# Patient Record
Sex: FEMALE | Race: WHITE | NOT HISPANIC OR LATINO | Employment: FULL TIME | URBAN - NONMETROPOLITAN AREA
[De-identification: names, ages, dates, MRNs, and addresses within clinical notes are randomized per-mention and may not be internally consistent; named-entity substitution may affect disease eponyms.]

---

## 2024-04-24 ENCOUNTER — OFFICE VISIT (OUTPATIENT)
Dept: URGENT CARE | Facility: CLINIC | Age: 29
End: 2024-04-24
Payer: MEDICAID

## 2024-04-24 VITALS
SYSTOLIC BLOOD PRESSURE: 117 MMHG | HEART RATE: 85 BPM | HEIGHT: 67 IN | WEIGHT: 190 LBS | RESPIRATION RATE: 20 BRPM | BODY MASS INDEX: 29.82 KG/M2 | DIASTOLIC BLOOD PRESSURE: 75 MMHG | TEMPERATURE: 97.5 F | OXYGEN SATURATION: 96 %

## 2024-04-24 DIAGNOSIS — J20.9 ACUTE BRONCHITIS, UNSPECIFIED ORGANISM: Primary | ICD-10-CM

## 2024-04-24 PROCEDURE — 99213 OFFICE O/P EST LOW 20 MIN: CPT | Performed by: NURSE PRACTITIONER

## 2024-04-24 RX ORDER — BENZONATATE 200 MG/1
200 CAPSULE ORAL 3 TIMES DAILY PRN
Qty: 20 CAPSULE | Refills: 0 | Status: SHIPPED | OUTPATIENT
Start: 2024-04-24 | End: 2024-05-01

## 2024-04-24 RX ORDER — AZITHROMYCIN 250 MG/1
TABLET, FILM COATED ORAL
Qty: 6 TABLET | Refills: 0 | Status: SHIPPED | OUTPATIENT
Start: 2024-04-24 | End: 2024-04-29

## 2024-04-24 RX ORDER — PREDNISONE 10 MG/1
TABLET ORAL DAILY
Qty: 21 TABLET | Refills: 0 | Status: SHIPPED | OUTPATIENT
Start: 2024-04-24 | End: 2024-04-29

## 2024-04-24 RX ORDER — ETONOGESTREL 68 MG/1
1 IMPLANT SUBCUTANEOUS ONCE
COMMUNITY

## 2024-04-24 RX ORDER — ALBUTEROL SULFATE 90 UG/1
2 AEROSOL, METERED RESPIRATORY (INHALATION) EVERY 6 HOURS PRN
Qty: 18 G | Refills: 0 | Status: SHIPPED | OUTPATIENT
Start: 2024-04-24 | End: 2025-04-24

## 2024-04-24 NOTE — PROGRESS NOTES
28 y.o. female presents for evaluation of URI.  Symptoms including cough, congestion, body aches, malaise, and headache have been present for 3 days and refractory to OTC meds.  No fever, chills, nausea, vomiting, abdominal pain, CP, or SOB.  No exacerbating factors. No known COVID 19/flu exposure. Declines testing today.    Vitals:    04/24/24 1555   BP: 117/75   Pulse: 85   Resp: 20   Temp: 36.4 °C (97.5 °F)   SpO2: 96%       No Known Allergies    Medication Documentation Review Audit       Reviewed by RENAN Pereira (Nurse Practitioner) on 04/24/24 at 1601      Medication Order Taking? Sig Documenting Provider Last Dose Status   etonogestrel-eluting contraceptive (Nexplanon) 68 mg implant implant [id number]  1 each by subdermal route 1 time. Historical Provider, MD  Active                    No past medical history on file.    No past surgical history on file.    ROS  See HPI    Physical Exam  Vitals and nursing note reviewed.   Constitutional:       General: She is not in acute distress.     Appearance: Normal appearance. She is normal weight. She is not ill-appearing or toxic-appearing.   HENT:      Head: Normocephalic and atraumatic.      Right Ear: Tympanic membrane and ear canal normal.      Left Ear: Tympanic membrane and ear canal normal.      Nose: Congestion present.      Mouth/Throat:      Mouth: Mucous membranes are moist.      Pharynx: Oropharynx is clear.   Eyes:      Extraocular Movements: Extraocular movements intact.      Conjunctiva/sclera: Conjunctivae normal.      Pupils: Pupils are equal, round, and reactive to light.   Cardiovascular:      Rate and Rhythm: Normal rate and regular rhythm.   Pulmonary:      Effort: Pulmonary effort is normal.      Breath sounds: Normal breath sounds.      Comments: Moist cough during exam  Lymphadenopathy:      Cervical: Cervical adenopathy present.   Skin:     General: Skin is warm and dry.      Capillary Refill: Capillary refill takes less than 2  seconds.   Neurological:      General: No focal deficit present.      Mental Status: She is alert and oriented to person, place, and time.   Psychiatric:         Mood and Affect: Mood normal.         Behavior: Behavior normal.           Assessment/Plan/MDM  Rachael was seen today for sore throat.  Diagnoses and all orders for this visit:  Acute bronchitis, unspecified organism (Primary)  -     azithromycin (Zithromax Z-Thompson) 250 mg tablet; Take 2 tablets (500 mg) on  Day 1,  followed by 1 tablet (250 mg) once daily on Days 2 through 5.  -     predniSONE (Deltasone) 10 mg tablet; Take 6 tablets (60 mg) by mouth once daily for 1 day, THEN 5 tablets (50 mg) once daily for 1 day, THEN 4 tablets (40 mg) once daily for 1 day, THEN 3 tablets (30 mg) once daily for 1 day, THEN 2 tablets (20 mg) once daily for 1 day, THEN 1 tablet (10 mg) once daily for 1 day.  -     albuterol 90 mcg/actuation inhaler; Inhale 2 puffs every 6 hours if needed for wheezing.  -     benzonatate (Tessalon) 200 mg capsule; Take 1 capsule (200 mg) by mouth 3 times a day as needed for cough for up to 7 days. Do not crush or chew.    Encouraged pt to continue otc cold remedies PRN, push by mouth fluids and rest. Patient's clinical presentation is otherwise unremarkable at this time. Patient is discharged with instructions to follow-up with primary care or seek emergency medical attention for worsening symptoms or any new concerns.      I did personally review Rachael's past medical history, surgical history, social history, as well as family history (when relevant).  In this case, I also oversaw the her drug management by reviewing her medication list, allergy list, as well as the medications that I prescribed during the UC course and/or recommended as an out-patient (including possible OTC medications such as acetaminophen, NSAIDs , etc).    After reviewing the items above, I did not look at previous medical documentation, such as recent  hospitalizations, office visits, and/or recent consultations with PCP/specialist.                          SDOH:   Another factor that I considered in Rachael's care was her Social Determinants of Health (SDOH). During this UC encounter, she did not have social determinants of health. Those SDOH influencing Rachael's care are: none      Satya Ballard CNP  [facility]  [phone]

## 2024-04-24 NOTE — LETTER
April 24, 2024     Patient: Rachael Gauthier   YOB: 1995   Date of Visit: 4/24/2024       To Whom It May Concern:    Rachael Gauthier was seen in my clinic on 4/24/2024 at 3:50 pm. Please excuse Rachael for her absence from work on this day to make the appointment.    If you have any questions or concerns, please don't hesitate to call.         Sincerely,         Satya Ballard, APRN-CNP        CC: No Recipients

## 2024-07-24 ENCOUNTER — OFFICE VISIT (OUTPATIENT)
Dept: URGENT CARE | Facility: CLINIC | Age: 29
End: 2024-07-24
Payer: MEDICAID

## 2024-07-24 VITALS
SYSTOLIC BLOOD PRESSURE: 99 MMHG | DIASTOLIC BLOOD PRESSURE: 64 MMHG | HEART RATE: 69 BPM | BODY MASS INDEX: 29.66 KG/M2 | RESPIRATION RATE: 16 BRPM | OXYGEN SATURATION: 97 % | WEIGHT: 189 LBS | TEMPERATURE: 97.8 F | HEIGHT: 67 IN

## 2024-07-24 DIAGNOSIS — L24.7 IRRITANT CONTACT DERMATITIS DUE TO PLANTS, EXCEPT FOOD: Primary | ICD-10-CM

## 2024-07-24 PROCEDURE — 99213 OFFICE O/P EST LOW 20 MIN: CPT | Performed by: NURSE PRACTITIONER

## 2024-07-24 PROCEDURE — 96372 THER/PROPH/DIAG INJ SC/IM: CPT | Performed by: NURSE PRACTITIONER

## 2024-07-24 PROCEDURE — 2500000004 HC RX 250 GENERAL PHARMACY W/ HCPCS (ALT 636 FOR OP/ED): Performed by: NURSE PRACTITIONER

## 2024-07-24 RX ORDER — PREDNISONE 10 MG/1
TABLET ORAL
Qty: 30 TABLET | Refills: 0 | Status: SHIPPED | OUTPATIENT
Start: 2024-07-24

## 2024-07-24 RX ORDER — TRIAMCINOLONE ACETONIDE 5 MG/G
CREAM TOPICAL 3 TIMES DAILY
Qty: 30 G | Refills: 0 | Status: SHIPPED | OUTPATIENT
Start: 2024-07-24

## 2024-07-24 NOTE — PROGRESS NOTES
29 y.o. female presents for evaluation of rash scattered over entire body including left side of face that began 4 days ago.  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  No OTC meds for management.  No fever, drainage from rash, significant erythema or any other associated symptom or complaint.      Vitals:    07/24/24 1247   BP: 99/64   Pulse: 69   Resp: 16   Temp: 36.6 °C (97.8 °F)   SpO2: 97%       No Known Allergies    Medication Documentation Review Audit       Reviewed by Genny Grant MA (Medical Assistant) on 07/24/24 at 1245      Medication Order Taking? Sig Documenting Provider Last Dose Status   albuterol 90 mcg/actuation inhaler [id number] No Inhale 2 puffs every 6 hours if needed for wheezing.   Patient not taking: Reported on 7/24/2024    Satya Ballard, APRN-CNP Not Taking Active   etonogestrel-eluting contraceptive (Nexplanon) 68 mg implant implant [id number] Yes 1 each by subdermal route 1 time. Historical Provider, MD Taking Active                    History reviewed. No pertinent past medical history.    History reviewed. No pertinent surgical history.    ROS  See HPI    Physical Exam  Vitals reviewed.   Constitutional:       Appearance: Normal appearance.   Cardiovascular:      Rate and Rhythm: Normal rate.   Musculoskeletal:         General: Normal range of motion.   Skin:     General: Skin is warm and dry.      Findings: Rash (fine vesicular mildly erythematous rash scattered over bilateral upper and lower extremities, abdomen and left side of face without involvement of the eye) present.   Neurological:      General: No focal deficit present.      Mental Status: She is alert and oriented to person, place, and time.   Psychiatric:         Mood and Affect: Mood normal.         Behavior: Behavior normal.           Assessment/Plan/MDM  Rachael was seen today for rash.  Diagnoses and all orders for this visit:  Irritant contact dermatitis due to  plants, except food (Primary)  -     methylPREDNISolone sod succinate (SOLU-Medrol) injection 125 mg  -     predniSONE (Deltasone) 10 mg tablet; 4 tabs po daily x 3 days, then 3 tabs po daily x 3 days, then 2 tab po daily  x3 days, then 1 tab po daily  x 3 days  -     triamcinolone (Kenalog) 0.5 % cream; Apply topically 3 times a day.    Encouraged patient to trial OTC poison ivy remedies as well as prescriptions provided today.  Patient's clinical presentation is otherwise unremarkable at this time. Patient is discharged with instructions to follow-up with primary care or seek emergency medical attention for worsening symptoms or any new concerns.    I did personally review Rachael's past medical history, surgical history, social history, as well as family history (when relevant).  In this case, I also oversaw the her drug management by reviewing her medication list, allergy list, as well as the medications that I prescribed during the UC course and/or recommended as an out-patient (including possible OTC medications such as acetaminophen, NSAIDs , etc).    After reviewing the items above, I did not look at previous medical documentation, such as recent hospitalizations, office visits, and/or recent consultations with PCP/specialist.                          SDOH:   Another factor that I considered in Rachael's care was her Social Determinants of Health (SDOH). During this UC encounter, she did not have social determinants of health. Those SDOH influencing Rachael's care are: none      Satya Ballard CNP  [facility]  [phone]

## 2024-07-24 NOTE — LETTER
July 24, 2024     Patient: Rachael Gauthier   YOB: 1995   Date of Visit: 7/24/2024       To Whom It May Concern:    Rachael Gauthier was seen in my clinic on 7/24/2024 at 12:45 pm. Please excuse Rachael for her absence from work on this day to make the appointment.    If you have any questions or concerns, please don't hesitate to call.         Sincerely,         Satya Ballard, APRN-CNP        CC: No Recipients

## 2025-02-20 ENCOUNTER — HOSPITAL ENCOUNTER (EMERGENCY)
Age: 30
Discharge: LEFT BEFORE DISCHARGE | End: 2025-02-20
Payer: MEDICAID

## 2025-02-20 VITALS
RESPIRATION RATE: 18 BRPM | TEMPERATURE: 98.24 F | HEART RATE: 134 BPM | SYSTOLIC BLOOD PRESSURE: 124 MMHG | OXYGEN SATURATION: 99 % | DIASTOLIC BLOOD PRESSURE: 99 MMHG

## 2025-02-20 VITALS
OXYGEN SATURATION: 99 % | DIASTOLIC BLOOD PRESSURE: 99 MMHG | SYSTOLIC BLOOD PRESSURE: 124 MMHG | TEMPERATURE: 98.24 F | HEART RATE: 126 BPM | BODY MASS INDEX: 30.4 KG/M2 | RESPIRATION RATE: 18 BRPM

## 2025-02-20 DIAGNOSIS — N83.202: ICD-10-CM

## 2025-02-20 DIAGNOSIS — N12: Primary | ICD-10-CM

## 2025-02-20 LAB
ALANINE AMINOTRANSFER ALT/SGPT: 89 U/L (ref 13–56)
ALBUMIN SERPL-MCNC: 4.2 G/DL (ref 3.2–5)
ALKALINE PHOSPHATASE: 80 U/L (ref 45–117)
ANION GAP: 5 (ref 5–15)
AST(SGOT): 65 U/L (ref 15–37)
BILIRUB UR QL STRIP: 1 MG/DL
BUN SERPL-MCNC: 15 MG/DL (ref 7–18)
BUN/CREAT RATIO: 15.2 RATIO (ref 10–20)
CALCIUM SERPL-MCNC: 9.6 MG/DL (ref 8.5–10.1)
CARBON DIOXIDE: 29 MMOL/L (ref 21–32)
CHLORIDE: 106 MMOL/L (ref 98–107)
CREAT SERPL-MCNC: 0.99 MG/DL (ref 0.55–1.02)
DEPRECATED RDW RBC: 41.1 FL (ref 35.1–43.9)
ERYTHROCYTE [DISTWIDTH] IN BLOOD: 12.2 % (ref 11.6–14.6)
EST GLOM FILT RATE - AFR AMER: 86 ML/MIN (ref 60–?)
ESTIMATED CREATININE CLEARANCE: 96.43 ML/MIN
GLOBULIN: 4.2 G/DL (ref 2.2–4.2)
GLUCOSE: 87 MG/DL (ref 74–106)
HCT VFR BLD AUTO: 42.8 % (ref 37–47)
HEMOGLOBIN: 14.2 G/DL (ref 12–15)
HGB BLD-MCNC: 14.2 G/DL (ref 12–15)
IMMATURE GRANULOCYTES COUNT: 0.03 X10^3/UL (ref 0–0)
INTERNAL QC VALIDATED?: (no result)
KETONE-DIPSTICK: 15 MG/DL
LEUKOCYTE ESTERASE UR QL STRIP: 500 /UL
MCV RBC: 91.8 FL (ref 81–99)
MEAN CORP HGB CONC: 33.2 G/DL (ref 32–36)
MEAN PLATELET VOL.: 10.1 FL (ref 6.2–12)
MUCOUS THREADS URNS QL MICRO: (no result) /HPF
NRBC FLAGGED BY ANALYZER: 0 % (ref 0–5)
PLATELET # BLD: 276 K/MM3 (ref 150–450)
PLATELET COUNT: 276 K/MM3 (ref 150–450)
POTASSIUM: 3.3 MMOL/L (ref 3.5–5.1)
PROT UR QL STRIP.AUTO: 30 MG/DL
RBC # BLD AUTO: 4.66 M/MM3 (ref 4.2–5.4)
RBC DISTRIBUTION WIDTH CV: 12.2 % (ref 11.6–14.6)
RBC DISTRIBUTION WIDTH SD: 41.1 FL (ref 35.1–43.9)
RBC UR QL: (no result) /HPF (ref 0–5)
RBC UR QL: 10 /UL
SP GR UR: 1.02 (ref 1–1.03)
SQUAMOUS URNS QL MICRO: (no result) /HPF (ref 5–10)
URINE PRESERVATIVE: (no result)
WBC # BLD AUTO: 8.5 K/MM3 (ref 4.4–11)
WHITE BLOOD COUNT: 8.5 K/MM3 (ref 4.4–11)

## 2025-02-20 PROCEDURE — 96374 THER/PROPH/DIAG INJ IV PUSH: CPT

## 2025-02-20 PROCEDURE — 85025 COMPLETE CBC W/AUTO DIFF WBC: CPT

## 2025-02-20 PROCEDURE — 74177 CT ABD & PELVIS W/CONTRAST: CPT

## 2025-02-20 PROCEDURE — 87631 RESP VIRUS 3-5 TARGETS: CPT

## 2025-02-20 PROCEDURE — 87088 URINE BACTERIA CULTURE: CPT

## 2025-02-20 PROCEDURE — 96375 TX/PRO/DX INJ NEW DRUG ADDON: CPT

## 2025-02-20 PROCEDURE — 83605 ASSAY OF LACTIC ACID: CPT

## 2025-02-20 PROCEDURE — 80053 COMPREHEN METABOLIC PANEL: CPT

## 2025-02-20 PROCEDURE — 87086 URINE CULTURE/COLONY COUNT: CPT

## 2025-02-20 PROCEDURE — 99283 EMERGENCY DEPT VISIT LOW MDM: CPT

## 2025-02-20 PROCEDURE — 81025 URINE PREGNANCY TEST: CPT

## 2025-02-20 PROCEDURE — A4216 STERILE WATER/SALINE, 10 ML: HCPCS

## 2025-02-20 PROCEDURE — 96361 HYDRATE IV INFUSION ADD-ON: CPT

## 2025-02-20 PROCEDURE — 81001 URINALYSIS AUTO W/SCOPE: CPT

## 2025-02-20 NOTE — ED.RN
PT REQUESTED HER IV TO BE REMOVED AND IS LEAVING.  STATES SHE W3AS MOVED FROM HER ROOM TO A HALLWAY BED AND ALSO HAD VISITORS COME BACK WHEN SHE DIDN'T WANT ANY.  STATES SOMETHING ABOUT A SISTER IN LAW BUT DIDN'T REPEAT WHAT WAS SAID SO UNSURE WHAT 
IS GOING ON.  IV WAS REMOVED AND PT AMBULATED FROM ER.

## 2025-02-20 NOTE — EDS_ITS
HPI    
HPI - GI    
History of Present Illness    
Chief Complaint: Flank Pain    
Informant: patient    
Narrative    
Narrative:     
Patient is a 29-year-old female with history of pyelonephritis and GLEN   
presenting with worsening nausea, vomiting and left flank pain.  Patient states   
she came down with flulike symptoms with nausea and vomiting 5 days ago.  She is  
having worsening pain in the back that started yesterday.  She describes the   
pain as constant with fluctuating intensity.  States it radiates to her back.   
States the pain is now unbearable.   She does want her left at the bottom of her  
ribs.  She does note her urines been dark for the past 3 days.  She denies any   
dysuria.  She denies any abnormal vaginal discharge.  She has no history of   
kidney stones.  Has Not noticed any hematuria but notes her urine has been   
darker.  Yesterday she had temperature by 9.9.  Yesterday she went to an outside  
emergency room where they did a urinalysis, gave her a dose of Zofran IM Toradol  
and discharged her home with a prescription for Omnicef, Zofran and oral   
Toradol.  She feels her symptoms are worsening came in for repeat evaluation.    
She tells me that about 2 years ago she had kidney infection with E. coli is   
admitted to the hospital for couple days.  She had an GLEN at that time.  She   
follows with outpatient labs for 7 months until her kidney function normalized.   
Denies any history of any abdominal surgeries.  Denies any concern for   
pregnancy.  No other complaints or concerns at this time.    
    
PFSH    
PFSH    
                                            
    
    
    
Allergy/AdvReac Type Severity Reaction Status Date / Time    
     
No Known Allergies Allergy   Verified 02/20/25 10:45    
    
    
    
Social History (Reviewed 02/20/25 @ 20:40 by Dr. Kacey Sanches, DO)    
Smoking Status:  Never smoker     
    
    
    
ROS    
ROS ED    
Constitutional    
Constitutional ED: Reports chills; Denies fever(s)    
Cardiovascular    
Cardiovascular: Denies chest pain    
Respiratory/Chest    
Respiratory/Chest: Denies cough or dyspnea    
Gastrointestinal    
Gastrointestinal: Reports abdominal pain, nausea and vomiting; Denies   
constipation or diarrhea    
Genitourinary    
Genitourinary ED: Denies dysuria, hematuria or urinary frequency    
Musculoskeletal    
Musculoskeletal: Reports back pain    
Integumentary    
Denies rash    
Neurologic    
Neurologic: Denies weakness    
    
EXAM    
Physical Exam    
Const    
Vital Signs:     
    
                                            
    
    
    
 02/20/25    
10:44 02/20/25    
10:44 02/20/25    
11:22    
     
Temperature  98.3 F 98.3 F    
     
Temperature Source  Temporal Temporal    
     
Pulse Rate 126 H 134 H 134 H    
     
Respiratory Rate  18 18    
     
Blood Pressure  124/99 H 124/99 H    
     
Blood Pressure Mean  107 107    
     
Pulse Ox  99 99    
     
Oxygen Delivery Method  Room Air Room Air    
    
    
    
    
Positive well nourished and well developed    
General Appearance ED: well developed and NAD    
HEENT    
Reports moist mucous membranes    
Eyes    
General Eye ED: Negative for scleral icterus    
Neck    
supple    
Resp    
normal respiratory effort and clear to auscultation bilaterally    
Cardio    
regular rate, regular rhythm and no murmurs    
GI    
non-tender and non-distended    
Auscultation: normoactive bowel sounds    
Palpation: soft; Negative for tender or guarding    
Back/Spine    
General Back: CVA tenderness left    
Thoracic Spine / Upper Back: Negative for thoracic spinal tenderness    
Lumbar Spine / Lower Back: Negative for lumbar spinal tenderness    
Extremity    
General Extremety ED: Negative for edema    
General Extremity: Negative for edema    
Neuro    
Sensorium / Orientation: alert    
Motor Exam: Negative for general weakness    
Psych    
mental status grossly normal and thought process normal    
Skin    
Rashes: no rashes    
    
MDM    
MDM    
MDM Narrative    
Medical decision making narrative:     
Patient is evaluated for worsening left flank pain.  Had outside urinalysis)   
antibiotics yesterday.    
Differential includes pyelonephritis, renal colic, colitis, muscle skeletal   
pain, shingles, ectopic pregnancy and gastritis.  She does not have any   
overlying rash or lower suspicion for shingles.    
Patient initially given IV fluids she reports multiple sets of vomiting is ta  
chycardic on arrival, Zofran, Toradol and morphine for symptom control.    
    
CBC normal.  Lactate added on because of patient's tachycardia but this is   
normal.  CMP shows mild elevation of AST and LAT which is nonspecific.  This is   
on the wrong side of the patient's pain so lower suspicion for acute   
hepatobiliary process.  Urinalysis is consistent with infection with 10-25 white  
blood cells, 1+ bacteria and 500 leukocyte esterase.  Her pregnancy test is   
negative.  Lower suspicion for ectopic based on negative pregnancy test.    
CT of the abdomen pelvis with IV contrast obtained shows a 2.3 cm cyst on the   
left ovary but otherwise no acute process.  Her pain is pretty localized to the   
CVA regions of lower suspicion that ovarian torsion is causing her symptoms.    
Patient eloped to the emergency room prior to going over results.  Per nursing   
report patient became quite upset when her spouse and 2 young children were   
brought back to see her stating that she did not consent for visitors.  Nursing   
staff informing that she never informed them that she did not want visitors.    
    
Given that review of her ER visit from yesterday through [facility] shows that   
she was prescribed cefdinir she should continue to take this.  Urine culture was  
sent today.    
Because of her pain she will be called pyelonephritis however she is not have   
any other laboratory finding concerning for systemic infection.    
Unable to evaluate patient to see how she felt after interventions or to repeat   
heart rate but she had eloped from the emergency room.    
    
Lab Data    
Labs:     
    
                         Laboratory Results - last 24 hr    
    
    
    
  02/20/25 02/20/25    
    
  11:30 11:59    
     
WBC  8.5     
     
RBC  4.66     
     
Hgb  14.2     
     
Hct  42.8     
     
MCV  91.8     
     
MCH  30.5     
     
MCHC  33.2     
     
RDW Std Deviation  41.1     
     
RDW Coeff of Evaristo  12.2     
     
Plt Count  276     
     
MPV  10.1     
     
Immature Gran % (Auto)  0.400     
     
Neut % (Auto)  54.7     
     
Lymph % (Auto)  36.1     
     
Mono % (Auto)  6.5     
     
Eos % (Auto)  1.7     
     
Baso % (Auto)  0.6     
     
Absolute Neuts (auto)  4.6     
     
Absolute Lymphs (auto)  3.05     
     
Nucleated RBC %  0     
     
Sodium  140     
     
Potassium  3.3 L     
     
Chloride  106     
     
Carbon Dioxide  29.0     
     
Anion Gap  5     
     
BUN  15     
     
Creatinine  0.99     
     
Estim Creat Clear Calc  96.43     
     
Est GFR (MDRD) Af Amer  86     
     
Est GFR (MDRD) Non-Af  71     
     
BUN/Creatinine Ratio  15.2     
     
Glucose  87     
     
Lactic Acid  1.3     
     
Calcium  9.6     
     
Total Bilirubin  1.20 H     
     
AST  65 H     
     
ALT  89 H     
     
Alkaline Phosphatase  80     
     
Total Protein  8.4 H     
     
Albumin  4.2     
     
Globulin  4.2     
     
Albumin/Globulin Ratio  1.0     
     
Urine Color   Yellow    
     
Urine Clarity   Sl. Cloudy    
     
Urine pH   6.0    
     
Ur Specific Gravity   1.020    
     
Urine Protein   30 H    
     
Urine Glucose (UA)   Normal    
     
Urine Ketones   15 H    
     
Urine Occult Blood   10 H    
     
Urine Nitrite   Negative    
     
Urine Bilirubin   1 H    
     
Urine Urobilinogen   12 H    
     
Ur Leukocyte Esterase   500 H    
     
Urine RBC   0 SEEN    
     
Urine WBC   10-25 SEEN    
     
Ur Squamous Epith Cells   5-10 SEEN    
     
Urine Bacteria   1+    
     
Urine Mucus   1+    
     
Urine Pregnancy Test   Negative    
    
    
    
    
Radiography    
Diagnostic Testing:     
    
Clinical Impression(s) from Imaging Studies    
    
Abdomen/Pelvis CT  02/20/25 12:30    
IMPRESSION:    
2.3 cm cyst in the left ovary.    
     
One or more dose reduction techniques were used (e.g., Automated exposure   
control, adjustment of the mA and/or kV according to    
patient size, use of iterative reconstruction technique).    
     
Electronically Signed By: Farhad Madera on 02/20/2025 13:08    
[facility]    
    
    
    
    
    
Discharge Plan    
Triage    
Chief Complaint: Flank Pain    
    
ED Provider: Kacey Sanches    
    
Dx/Rx/DC Orders    
Clinical Impression:    
 Acute left flank pain, Pyelonephritis    
    
    
Primary Care Provider: Care Physician,No Primary    
    
Referrals:    
Care Physician,No Primary [Primary Care Provider] -     
    
Print Language: English    
    
Disposition    
***Disposition***: Elopement    
    
Discharge Date/Time: 02/20/25 13:54

## 2025-02-20 NOTE — CT_ITS
PROCEDURE:  
ABDOMEN/PELVIS W IV CONT ONLY  
   
REASON FOR EXAM:  
Left flank pain.  Recent diagnosis of renal infection.  
   
TECHNIQUE:  
Abdomen and pelvis CT with intravenous contrast. No oral contrast.  
IV CONTRAST: 100 cc of Isovue-300.  
   
COMPARISON:  
None.  
   
FINDINGS:  
Lung bases: Clear  
   
Liver: Unremarkable.  
   
Gallbladder: Unremarkable.  
   
Spleen: Unremarkable.  
   
Pancreas: Unremarkable.  
   
Adrenals: Unremarkable.  
   
Kidneys: Unremarkable.  
   
Bladder: Unremarkable.  
   
Reproductive Organs: 2.3 cm cyst in the left ovary.  Follicles are seen in the 
right ovary.  
   
Bowel: Colonic diverticulosis without diverticulitis.  
   
Appendix: Normal.  
   
Lymph nodes: No suspicious lymph node enlargement.  
   
Vasculature: Major vascular structures are unremarkable.  
   
Peritoneum / Retroperitoneum: No ascites. No free air.  
   
Bones: Unremarkable.  
   
ORDER #: [id number] CT/Abdomen/Pelvis W IV Cont ONLY  
IMPRESSION:  
2.3 cm cyst in the left ovary.  
   
One or more dose reduction techniques were used (e.g., Automated exposure contr
ol, adjustment of the mA and/or kV according to  
patient size, use of iterative reconstruction technique).  
   
Electronically Signed By: Farhad Madera on 02/20/2025 13:08  
Reading Location: [facility]

## 2025-06-02 ENCOUNTER — APPOINTMENT (OUTPATIENT)
Facility: CLINIC | Age: 30
End: 2025-06-02
Payer: MEDICAID

## 2025-06-02 VITALS — SYSTOLIC BLOOD PRESSURE: 104 MMHG | BODY MASS INDEX: 28.74 KG/M2 | HEIGHT: 68 IN | DIASTOLIC BLOOD PRESSURE: 62 MMHG

## 2025-06-02 DIAGNOSIS — Z30.09 ENCOUNTER FOR GENERAL COUNSELING AND ADVICE ON CONTRACEPTIVE MANAGEMENT: Primary | ICD-10-CM

## 2025-06-02 PROCEDURE — 99203 OFFICE O/P NEW LOW 30 MIN: CPT | Performed by: OBSTETRICS & GYNECOLOGY

## 2025-06-02 PROCEDURE — 1036F TOBACCO NON-USER: CPT | Performed by: OBSTETRICS & GYNECOLOGY

## 2025-06-02 ASSESSMENT — COLUMBIA-SUICIDE SEVERITY RATING SCALE - C-SSRS
6. HAVE YOU EVER DONE ANYTHING, STARTED TO DO ANYTHING, OR PREPARED TO DO ANYTHING TO END YOUR LIFE?: NO
1. IN THE PAST MONTH, HAVE YOU WISHED YOU WERE DEAD OR WISHED YOU COULD GO TO SLEEP AND NOT WAKE UP?: NO
2. HAVE YOU ACTUALLY HAD ANY THOUGHTS OF KILLING YOURSELF?: NO

## 2025-06-02 ASSESSMENT — ANXIETY QUESTIONNAIRES
3. WORRYING TOO MUCH ABOUT DIFFERENT THINGS: NOT AT ALL
4. TROUBLE RELAXING: NOT AT ALL
7. FEELING AFRAID AS IF SOMETHING AWFUL MIGHT HAPPEN: NOT AT ALL
2. NOT BEING ABLE TO STOP OR CONTROL WORRYING: NOT AT ALL
IF YOU CHECKED OFF ANY PROBLEMS ON THIS QUESTIONNAIRE, HOW DIFFICULT HAVE THESE PROBLEMS MADE IT FOR YOU TO DO YOUR WORK, TAKE CARE OF THINGS AT HOME, OR GET ALONG WITH OTHER PEOPLE: NOT DIFFICULT AT ALL
1. FEELING NERVOUS, ANXIOUS, OR ON EDGE: NOT AT ALL
6. BECOMING EASILY ANNOYED OR IRRITABLE: NOT AT ALL
5. BEING SO RESTLESS THAT IT IS HARD TO SIT STILL: NOT AT ALL
GAD7 TOTAL SCORE: 0

## 2025-06-02 ASSESSMENT — PATIENT HEALTH QUESTIONNAIRE - PHQ9
2. FEELING DOWN, DEPRESSED OR HOPELESS: NOT AT ALL
1. LITTLE INTEREST OR PLEASURE IN DOING THINGS: NOT AT ALL
SUM OF ALL RESPONSES TO PHQ9 QUESTIONS 1 AND 2: 0

## 2025-06-02 ASSESSMENT — PAIN SCALES - GENERAL: PAINLEVEL_OUTOF10: 0-NO PAIN

## 2025-06-02 NOTE — PROGRESS NOTES
Rachael Gauthier is a 29 y.o. year old female patient.  PCP = No primary care provider on file.    Chief Complaint   Patient presents with    Procedure     Pt here to have nexplanon removed and discuss other BC options. Pt states she had nexplanon inserted in [place] in May of 2022. Pt c/o break through bleeding and cramping       HPI   Presents stating that the Nexplanon was placed in May 2022.  She has noticed some irregular bleeding over the last 2 months but otherwise has been doing well with Nexplanon.  Denies any bowel or bladder problems.    OB History          2    Para   2    Term   2            AB        Living   2         SAB        IAB        Ectopic        Multiple        Live Births   2                 Medical History[1]    Surgical History[2]    Review of Systems:   Constitutional: No fever or chills  Respiratory: No shortness of breath, or cough  Cardiovascular: No chest pain or syncope  Breasts: No breast pain, no masses, no nipple discharge  Gastrointestinal: No nausea, vomiting, or diarrhea, no abdominal pain  Genitourinary: No dysuria or frequency  Gynecology: Negative except as noted in history of present illness  All other: All other systems reviewed and negative for complaint    Medication Documentation Review Audit       Reviewed by Jamal Luke MD (Physician) on 25 at 1447      Medication Order Taking? Sig Documenting Provider Last Dose Status   albuterol 90 mcg/actuation inhaler [id number]  Inhale 2 puffs every 6 hours if needed for wheezing.   Patient not taking: Reported on 2024    Satya Ballard, APRN-CNP   [id number] [phone]   etonogestrel-eluting contraceptive (Nexplanon) 68 mg implant implant [id number]  1 each by subdermal route 1 time. Historical Provider, MD  Active   predniSONE (Deltasone) 10 mg tablet [id number]  4 tabs po daily x 3 days, then 3 tabs po daily x 3 days, then 2 tab po daily  x3 days, then 1 tab po daily  x 3 days   Patient not  "taking: Reported on 6/2/2025    RENAN Pereira  Active   triamcinolone (Kenalog) 0.5 % cream [id number]  Apply topically 3 times a day.   Patient not taking: Reported on 6/2/2025    RENAN Pereira  Active                     /62   Ht 1.727 m (5' 8\")   LMP  (LMP Unknown)   BMI 28.74 kg/m²     PHYSICAL EXAMINATION:  General: No acute distress  Eye: Intraocular movements are intact  HEENT: Normocephalic  Respiratory: Respirations are nonlabored  Gastrointestinal: Nondistended   Musculoskeletal: Normal range of motion  Neurologic: Alert and oriented x3  Psychiatric: Cooperative, appropriate mood and affect.      Problem List Items Addressed This Visit    None  Visit Diagnoses         Encounter for general counseling and advice on contraceptive management    -  Primary             Provider Impression:  1.  Contraceptive counseling  Discussed various forms of contraception and she has decided to have the Nexplanon replaced.  Patient to return in the near future for replacing the Nexplanon.       [1] History reviewed. No pertinent past medical history.  [2] History reviewed. No pertinent surgical history.    "

## 2025-07-07 ENCOUNTER — HOSPITAL ENCOUNTER (OUTPATIENT)
Dept: RADIOLOGY | Facility: HOSPITAL | Age: 30
Discharge: HOME | End: 2025-07-07
Payer: MEDICAID

## 2025-07-07 ENCOUNTER — PROCEDURE VISIT (OUTPATIENT)
Facility: CLINIC | Age: 30
End: 2025-07-07
Payer: MEDICAID

## 2025-07-07 ENCOUNTER — OFFICE VISIT (OUTPATIENT)
Dept: URGENT CARE | Facility: CLINIC | Age: 30
End: 2025-07-07
Payer: MEDICAID

## 2025-07-07 VITALS
HEART RATE: 74 BPM | DIASTOLIC BLOOD PRESSURE: 81 MMHG | SYSTOLIC BLOOD PRESSURE: 120 MMHG | WEIGHT: 195 LBS | OXYGEN SATURATION: 99 % | BODY MASS INDEX: 30.61 KG/M2 | HEIGHT: 67 IN | TEMPERATURE: 98.7 F

## 2025-07-07 DIAGNOSIS — M17.10 PRIMARY OSTEOARTHRITIS OF KNEE, UNSPECIFIED LATERALITY: ICD-10-CM
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
## 2025-07-07 PROCEDURE — 73564 X-RAY EXAM KNEE 4 OR MORE: CPT | Mod: LT

## 2025-07-07 PROCEDURE — 73564 X-RAY EXAM KNEE 4 OR MORE: CPT | Mod: LEFT SIDE | Performed by: RADIOLOGY

## 2025-07-07 PROCEDURE — 99213 OFFICE O/P EST LOW 20 MIN: CPT | Performed by: NURSE PRACTITIONER

## 2025-07-07 RX ORDER — METHYLPREDNISOLONE 4 MG/1
TABLET ORAL
Qty: 21 TABLET | Refills: 0 | Status: SHIPPED | OUTPATIENT
Start: 2025-07-07

## 2025-07-07 NOTE — LETTER
July 7, 2025     Patient: Rachael Gauthier   YOB: 1995   Date of Visit: 7/7/2025       To Whom It May Concern:    It is my medical opinion that Rachael Gauthier may return to light duty immediately with the following restrictions: knee brace as needed for comfort/limit weight bearing per comfort.    If you have any questions or concerns, please don't hesitate to call.         Sincerely,        OSIEL Garcia-CNP    CC: No Recipients

## 2025-07-07 NOTE — PROGRESS NOTES
[facility] URGENT CARE NARAYAN NOTE:      Name: Rachael Gauthier, 29 y.o.    CSN:[id number]   MRN:[id number]    PCP: Gume Saunders PA-C    ALL:  Allergies[1]    History:    Chief Complaint: Knee Injury (Left knee pain after falling down some stairs X 3 days ago )    Encounter Date: 7/7/2025     HPI: The history was obtained from the patient. Rachael is a 29 y.o. female, who presents with a chief complaint of Knee Injury (Left knee pain after falling down some stairs X 3 days ago ).  States she was walking down her steps on Friday morning (approximately 10 steps) and was three fourths of the way down when she felt a staple underneath the carpet and fell down the remaining stairs onto her left knee. Did not hit head. Pain is about a 7 out of 10 with activity, but is weightbearing, ambulating with a limp.  Denies any laxity of the joint but does feel like her knee could give out.  There is some local swelling around the patella, without any ballotable effusion noted, and some bruising on the kneecap.  Physical exam was negative for injury to patellar ligaments, no laxity noted on varus or valgus stress.  Denies any numbness or tingling or loss of function to the left leg and knee.  Can achieve approximately 120-130 degrees of knee flexion similar to right knee however there is significant pain noted with full flexion.  Has been wearing knee brace/compression dressing at home as needed, been taking 2-3 ibuprofen every 4-6 hours for pain, had initially iced it, but best results have come from over-the-counter IcyHot pads.  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  She does need a participation clearance for clinical, will provide.  Advised her to limit weightbearing activities and moderate to intense physical activity, and perhaps choose a clinical assignment that is not demanding.  Denies any other constitutional complaints at this  time.    PMHx:    Medical History[2]         Current Medications[3]      PMSx:  Surgical History[4]    Fam Hx: Family History[5]    SOC. Hx:     Social History     Socioeconomic History    Marital status: Single     Spouse name: Not on file    Number of children: 2    Years of education: Not on file    Highest education level: Not on file   Occupational History    Not on file   Tobacco Use    Smoking status: Former     Types: Cigarettes    Smokeless tobacco: Never   Vaping Use    Vaping status: Every Day    Devices: Disposable   Substance and Sexual Activity    Alcohol use: Yes     Comment: rarely    Drug use: Yes     Types: Marijuana    Sexual activity: Yes   Other Topics Concern    Not on file   Social History Narrative    Not on file     Social Drivers of Health     Financial Resource Strain: Low Risk  (2/26/2025)    Received from [facility]    Overall Financial Resource Strain (CARDIA)     Difficulty of Paying Living Expenses: Not hard at all   Food Insecurity: No Food Insecurity (2/26/2025)    Received from [facility]    Hunger Vital Sign     Worried About Running Out of Food in the Last Year: Never true     Ran Out of Food in the Last Year: Never true   Transportation Needs: No Transportation Needs (2/26/2025)    Received from [facility]    PRAPARE - Transportation     Lack of Transportation (Medical): No     Lack of Transportation (Non-Medical): No   Physical Activity: Not on file   Stress: No Stress Concern Present (6/2/2025)    [place] [facility] [employer] - Occupational Stress Questionnaire     Feeling of Stress : Not at all   Social Connections: Unknown (2/26/2025)    Received from [facility]    Social Connection and Isolation Panel [NHANES]     Frequency of Communication with Friends and Family: Not on file     Frequency of Social Gatherings with Friends and Family: Once a week     Attends Anglican Services: Not on file     Active Member of Clubs or Organizations: Not on file     Attends  Club or Organization Meetings: Not on file     Marital Status: Not on file   Intimate Partner Violence: Not on file   Housing Stability: Not on file         Vitals:    07/07/25 1443   BP: 120/81   Pulse: 74   Temp: 37.1 °C (98.7 °F)   SpO2: 99%     88.5 kg (195 lb)          Physical Exam  Vitals reviewed.   Constitutional:       Appearance: Normal appearance.   HENT:      Head: Normocephalic and atraumatic.   Cardiovascular:      Rate and Rhythm: Normal rate and regular rhythm.      Pulses: Normal pulses.      Heart sounds: Normal heart sounds, S1 normal and S2 normal.   Pulmonary:      Effort: Pulmonary effort is normal.      Breath sounds: Normal breath sounds.   Abdominal:      General: Abdomen is flat. Bowel sounds are normal.      Palpations: Abdomen is soft.   Musculoskeletal:         General: Normal range of motion.        Legs:    Skin:     General: Skin is warm and dry.      Capillary Refill: Capillary refill takes less than 2 seconds.   Neurological:      Mental Status: She is alert. Mental status is at baseline.   Psychiatric:         Mood and Affect: Mood normal.         Behavior: Behavior normal.           LABORATORY @ RADIOLOGICAL IMAGING (if done):       Study Result    Narrative & Impression   Interpreted By:  Suleiman Pinto,   STUDY:  XR KNEE LEFT 4+ VIEWS      INDICATION:  Signs/Symptoms:fall.      COMPARISON:  None      ACCESSION NUMBER(S):  [id number]      ORDERING CLINICIAN:  ASIYA CHIU      FINDINGS:  No osseous, articular, or soft tissue abnormality identified.      IMPRESSION:  Normal left knee radiographs.      Signed by: Suleiman Pinto 7/7/2025 3:49 PM  Dictation workstation:   [id number]     ____________________________________________________________________    I did personally review Rachael's past medical history, surgical history, social history, as well as family history (when relevant).  In this case, I also oversaw the her drug management by reviewing her medication list, allergy  list, as well as the medications that I prescribed during the UC course and/or recommended as an out-patient (including possible OTC medications such as acetaminophen, NSAIDs , etc).    After reviewing the items above, I did look at previous medical documentation, such as recent hospitalizations, office visits, and/or recent consultations with PCP/specialist.                          SDOH:   Another factor that I considered in Rachael's care was her Social Determinants of Health (SDOH). During this UC encounter, she did not have social determinants of health. Those SDOH influencing Rachael's care are: none      UC COURSE/MEDICAL DECISION MAKING:    Rachael is a 29 y.o., who presents with a working diagnosis of   1. Fall, initial encounter     with a differential to include: Sprain, strain, contusion, fracture, avulsion fracture, dislocation, tendinitis, tenosynovitis    Plan:   XR left knee 4+ views - negative  Hinged knee splint provided  Cleared to continue with Nursing clinicals at [facility], with stipulation to continue to wear knee brace and limit physical act; pre participation clearance note provided  Return to urgent care, primary care provider, or emergency department with worsening symptoms.      I, Rodolfo Luke, helped prepare the medical record for my supervising clinician, Clover Jaramillo DNP.     Rodolfo Luke RN, [facility]    Supervised by  Clover Jaramillo DNP   Advanced Practice Provider  [facility] URGENT CARE    I was present with the [employer] student who participated in the documentation of this note. I have personally seen and re-examined the patient and performed the medical decision-making components (assessment and plan of care). I have reviewed the APRN student documentation and verified the findings in the note as written with additions or exceptions as stated in the body of this note.           [1] No Known Allergies  [2] No past medical history on file.  [3]   Current  Outpatient Medications   Medication Sig Dispense Refill    etonogestrel-eluting contraceptive (Nexplanon) 68 mg implant implant 1 each by subdermal route 1 time.      albuterol 90 mcg/actuation inhaler Inhale 2 puffs every 6 hours if needed for wheezing. (Patient not taking: Reported on 7/24/2024) 18 g 0    methylPREDNISolone (Medrol Dospak) 4 mg tablets Follow schedule on package instructions 21 tablet 0    predniSONE (Deltasone) 10 mg tablet 4 tabs po daily x 3 days, then 3 tabs po daily x 3 days, then 2 tab po daily  x3 days, then 1 tab po daily  x 3 days (Patient not taking: No sig reported) 30 tablet 0    triamcinolone (Kenalog) 0.5 % cream Apply topically 3 times a day. (Patient not taking: No sig reported) 30 g 0     No current facility-administered medications for this visit.   [4] No past surgical history on file.  [5]   Family History  Problem Relation Name Age of Onset    Hypertension Mother      Other (psychological disorder) Father      Hypertension Maternal Grandmother      Heart attack Maternal Grandfather      Esophageal cancer Maternal Grandfather      Ovarian cancer Paternal Grandmother      Hypertension Paternal Grandfather

## 2025-07-16 ENCOUNTER — APPOINTMENT (OUTPATIENT)
Facility: CLINIC | Age: 30
End: 2025-07-16
Payer: MEDICAID

## 2025-07-28 ENCOUNTER — APPOINTMENT (OUTPATIENT)
Facility: CLINIC | Age: 30
End: 2025-07-28
Payer: MEDICAID

## 2025-07-28 VITALS
BODY MASS INDEX: 35.47 KG/M2 | HEIGHT: 67 IN | WEIGHT: 226 LBS | SYSTOLIC BLOOD PRESSURE: 116 MMHG | DIASTOLIC BLOOD PRESSURE: 58 MMHG

## 2025-07-28 DIAGNOSIS — Z30.46 ENCOUNTER FOR REMOVAL AND REINSERTION OF NEXPLANON: ICD-10-CM

## 2025-07-28 LAB — PREGNANCY TEST URINE, POC: NEGATIVE

## 2025-07-28 PROCEDURE — 11983 REMOVE/INSERT DRUG IMPLANT: CPT | Performed by: OBSTETRICS & GYNECOLOGY

## 2025-07-28 PROCEDURE — 81025 URINE PREGNANCY TEST: CPT | Performed by: OBSTETRICS & GYNECOLOGY

## 2025-07-28 RX ORDER — LIDOCAINE HYDROCHLORIDE 10 MG/ML
5 INJECTION, SOLUTION INFILTRATION; PERINEURAL ONCE
Status: COMPLETED | OUTPATIENT
Start: 2025-07-28 | End: 2025-07-28

## 2025-07-28 RX ADMIN — LIDOCAINE HYDROCHLORIDE 5 ML: 10 INJECTION, SOLUTION INFILTRATION; PERINEURAL at 15:34

## 2025-07-28 ASSESSMENT — PAIN SCALES - GENERAL: PAINLEVEL_OUTOF10: 0-NO PAIN

## 2025-07-28 NOTE — PROGRESS NOTES
"Patient ID: Rachael Gauthier is a 30 y.o. female.  Current Nexplanon is in the left inner arm  Chief Complaint   Patient presents with    Procedure     Patient is here for Nexplanon removal and Replacement.         /58   Ht 1.702 m (5' 7\")   Wt 103 kg (226 lb)   BMI 35.40 kg/m²      Insertion of Contraceptive Capsule    Date/Time: 7/28/2025 3:33 PM    Performed by: Jamal Luke MD  Authorized by: Jamal Luke MD    Consent:     Consent obtained:  Written    Consent given by:  Patient    Patient questions answered: yes      Patient agrees, verbalizes understanding, and wants to proceed: yes    Indication:     Indication: Presence of non-biodegradable drug delivery implant    Pre-procedure:     Pre-procedure timeout performed: yes      Prepped with: povidone-iodine      Local anesthetic:  Lidocaine 1% (3 ml)    The site was cleaned and prepped in a sterile fashion: yes    Procedure:     Procedure:  Removal with reinsertion    Small stab incision was made in arm: yes      Left/right:  Left (Nexplanon removed intact)    Preloaded contraceptive capsule trocar was placed subdermally: yes      Visualization of implant was obtained: yes      Contraceptive capsule was inserted and trocar removed: yes      Visualization of notch in stylet and palpation of device: yes      Palpation confirms placement by provider and patient: yes      Site was closed with steri-strips and pressure bandage applied: yes    Comments:      Patient instructed to keep the insertion site clean and dry. May remove the gauze wrap in 5 days and the Steri-strips in 7 days. Follow up in 2 to 3 months for her annual exam.      Results for orders placed or performed in visit on 07/28/25 (from the past 24 hours)   POCT pregnancy, urine manually resulted   Result Value Ref Range    Preg Test, Ur Negative Negative        Chaperone present for exam: Zahira Brasher LPN  OBGyn Exam     Colposcopy - Cervix image     Problem List Items " Addressed This Visit    None  Visit Diagnoses         Encounter for removal and reinsertion of Nexplanon        Relevant Medications    lidocaine (Xylocaine) 10 mg/mL (1 %) injection 5 mL (Completed) (Start on 7/28/2025  4:00 PM)    etonogestrel-eluting contraceptive implant (Start on 7/28/2025  4:00 PM)    Other Relevant Orders    POCT pregnancy, urine manually resulted (Completed)    Insertion of Contraceptive Capsule

## 2025-10-22 ENCOUNTER — APPOINTMENT (OUTPATIENT)
Facility: CLINIC | Age: 30
End: 2025-10-22
Payer: MEDICAID